# Patient Record
Sex: FEMALE | Race: WHITE | NOT HISPANIC OR LATINO | URBAN - METROPOLITAN AREA
[De-identification: names, ages, dates, MRNs, and addresses within clinical notes are randomized per-mention and may not be internally consistent; named-entity substitution may affect disease eponyms.]

---

## 2024-10-12 ENCOUNTER — OFFICE VISIT (OUTPATIENT)
Dept: URGENT CARE | Age: 34
End: 2024-10-12
Payer: COMMERCIAL

## 2024-10-12 VITALS
DIASTOLIC BLOOD PRESSURE: 70 MMHG | OXYGEN SATURATION: 97 % | SYSTOLIC BLOOD PRESSURE: 112 MMHG | RESPIRATION RATE: 16 BRPM | TEMPERATURE: 98.8 F | HEART RATE: 80 BPM

## 2024-10-12 DIAGNOSIS — G43.909 MIGRAINE WITHOUT STATUS MIGRAINOSUS, NOT INTRACTABLE, UNSPECIFIED MIGRAINE TYPE: Primary | ICD-10-CM

## 2024-10-12 RX ORDER — PROMETHAZINE HYDROCHLORIDE 12.5 MG/1
25 TABLET ORAL EVERY 6 HOURS PRN
Qty: 30 TABLET | Refills: 0 | Status: SHIPPED | OUTPATIENT
Start: 2024-10-12 | End: 2024-10-19

## 2024-10-12 RX ORDER — PROMETHAZINE HYDROCHLORIDE 25 MG/ML
25 INJECTION, SOLUTION INTRAMUSCULAR; INTRAVENOUS ONCE
Status: COMPLETED | OUTPATIENT
Start: 2024-10-12 | End: 2024-10-12

## 2024-10-12 RX ORDER — KETOROLAC TROMETHAMINE 30 MG/ML
30 INJECTION, SOLUTION INTRAMUSCULAR; INTRAVENOUS ONCE
Status: DISCONTINUED | OUTPATIENT
Start: 2024-10-12 | End: 2024-10-13

## 2024-10-12 RX ORDER — TRIAMCINOLONE ACETONIDE 40 MG/ML
40 INJECTION, SUSPENSION INTRA-ARTICULAR; INTRAMUSCULAR ONCE
Status: COMPLETED | OUTPATIENT
Start: 2024-10-12 | End: 2024-10-12

## 2024-10-12 RX ORDER — SUMATRIPTAN 50 MG/1
50 TABLET, FILM COATED ORAL ONCE AS NEEDED
Qty: 9 TABLET | Refills: 0 | Status: SHIPPED | OUTPATIENT
Start: 2024-10-12

## 2024-10-12 ASSESSMENT — ENCOUNTER SYMPTOMS
DIARRHEA: 0
PHOTOPHOBIA: 1
NECK PAIN: 0
WEAKNESS: 0
HEADACHES: 1
COUGH: 0
MYALGIAS: 0
ABDOMINAL PAIN: 0
BACK PAIN: 0
SWOLLEN GLANDS: 0
SYNCOPE: 0
EYE PAIN: 0
PARESTHESIAS: 0
VISUAL CHANGE: 0
FATIGUE: 1
LOSS OF BALANCE: 0
NECK STIFFNESS: 0
FEVER: 0
NAUSEA: 1
SINUS PRESSURE: 0
NUMBNESS: 0
NEAR-SYNCOPE: 0
VOMITING: 0
BLURRED VISION: 0
SEIZURES: 0
DIZZINESS: 0
TINGLING: 0
FOCAL WEAKNESS: 0
SORE THROAT: 0

## 2024-10-12 NOTE — PROGRESS NOTES
Subjective   Patient ID: Nirmala Kinney is a 34 y.o. female. They present today with a chief complaint of Headache (Hx Migraines. 4 days).    History of Present Illness  Pt reported history of migraines. She was treated with Toradol, steroid and compazine injection in the past. Pt left her prescription of imitrex 50 mg PO at her home in [place]. Pt requested for the medication and phenergan.      History provided by:  Patient   used: No    Headache  Pain location:  Frontal and L parietal  Radiates to:  Does not radiate  Severity currently:  6/10  Onset quality:  Gradual  Duration:  4 days  Timing:  Constant  Progression:  Waxing and waning  Chronicity:  New  Similar to prior headaches: no    Relieved by:  Nothing  Worsened by:  Activity, light, neck movement and sound  Ineffective treatments: Nurtec, ibuprofen and Tylenol.  Associated symptoms: fatigue, nausea and photophobia    Associated symptoms: no abdominal pain, no back pain, no blurred vision, no congestion, no cough, no diarrhea, no dizziness, no drainage, no ear pain, no eye pain, no facial pain, no fever, no focal weakness, no hearing loss, no loss of balance, no myalgias, no near-syncope, no neck pain, no neck stiffness, no numbness, no paresthesias, no seizures, no sinus pressure, no sore throat, no swollen glands, no syncope, no tingling, no URI, no visual change, no vomiting and no weakness    Associated symptoms comment:  Nausea      Past Medical History  Allergies as of 10/12/2024 - Reviewed 10/12/2024   Allergen Reaction Noted    Morphine Hives 10/12/2024    Zofran [ondansetron hcl] GI Upset 10/12/2024       (Not in a hospital admission)       Past Medical History:   Diagnosis Date    Calculus of kidney     Bilateral nephrolithiasis       Past Surgical History:   Procedure Laterality Date    OTHER SURGICAL HISTORY  08/19/2018    Lithotripsy            Review of Systems  Review of Systems   Constitutional:  Positive for fatigue.  Negative for fever.   HENT:  Negative for congestion, ear pain, hearing loss, postnasal drip, sinus pressure and sore throat.    Eyes:  Positive for photophobia. Negative for blurred vision and pain.   Respiratory:  Negative for cough.    Cardiovascular:  Negative for syncope and near-syncope.   Gastrointestinal:  Positive for nausea. Negative for abdominal pain, diarrhea and vomiting.   Musculoskeletal:  Negative for back pain, myalgias, neck pain and neck stiffness.   Neurological:  Positive for headaches. Negative for dizziness, focal weakness, seizures, weakness, numbness, paresthesias and loss of balance.                                  Objective    Vitals:    10/12/24 1940   BP: 112/70   Pulse: 80   Resp: 16   Temp: 37.1 °C (98.8 °F)   SpO2: 97%     No LMP recorded.    Physical Exam  Vitals and nursing note reviewed.   Constitutional:       Appearance: Normal appearance.   HENT:      Head: Normocephalic and atraumatic.      Right Ear: Hearing, tympanic membrane, ear canal and external ear normal.      Left Ear: Hearing, tympanic membrane, ear canal and external ear normal.      Nose: Nose normal. No nasal deformity, septal deviation, signs of injury, laceration, nasal tenderness, mucosal edema, congestion or rhinorrhea.      Right Sinus: No maxillary sinus tenderness or frontal sinus tenderness.      Left Sinus: No maxillary sinus tenderness or frontal sinus tenderness.      Mouth/Throat:      Lips: Pink.      Mouth: Mucous membranes are moist.      Pharynx: Uvula midline.      Tonsils: No tonsillar exudate or tonsillar abscesses.   Cardiovascular:      Rate and Rhythm: Normal rate and regular rhythm.      Heart sounds: Normal heart sounds.   Pulmonary:      Effort: Pulmonary effort is normal.      Breath sounds: Normal breath sounds and air entry.   Musculoskeletal:      Cervical back: Normal range of motion and neck supple.   Lymphadenopathy:      Cervical: No cervical adenopathy.   Neurological:       Mental Status: She is alert.   Psychiatric:         Mood and Affect: Mood normal.         Behavior: Behavior normal.         Procedures    Point of Care Test & Imaging Results from this visit  No results found for this visit on 10/12/24.   No results found.    Diagnostic study results (if any) were reviewed by [facility].    Assessment/Plan   Allergies, medications, history, and pertinent labs/EKGs/Imaging reviewed by ANGELITO Castro.     Medical Decision Making  Patient Toradol, steriod, and compazine improved the migraine in the past.  Toradol, steroid, and phenergan administered during the visit.  Take Imitrex and Phenergan as instructed.   Follow up with your primary care provider as needed.  Call 911 or go to the nearest ER if the symptoms worsen.  Pt verbalized understanding. Patient left in stable condition.    Orders and Diagnoses  Diagnoses and all orders for this visit:  Migraine without status migrainosus, not intractable, unspecified migraine type  -     triamcinolone acetonide (Kenalog-40) injection 40 mg  -     ketorolac (Toradol) injection 30 mg  -     promethazine (Phenergan) injection 25 mg  -     SUMAtriptan (Imitrex) 50 mg tablet; Take 1 tablet (50 mg) by mouth 1 time if needed for migraine for up to 9 doses. May repeat dose once in 2 hours if no relief.  Do not exceed 2 doses in 24 hours.  -     promethazine (Phenergan) 12.5 mg tablet; Take 2 tablets (25 mg) by mouth every 6 hours if needed for nausea or vomiting for up to 7 days.      Medical Admin Record      Patient disposition: Home    Electronically signed by [facility]  8:09 PM

## 2024-10-13 RX ORDER — KETOROLAC TROMETHAMINE 30 MG/ML
30 INJECTION, SOLUTION INTRAMUSCULAR; INTRAVENOUS ONCE
Status: COMPLETED | OUTPATIENT
Start: 2024-10-13 | End: 2024-10-13

## 2024-10-13 ASSESSMENT — PAIN SCALES - PAIN ASSESSMENT IN ADVANCED DEMENTIA (PAINAD): TOTALSCORE: MEDICATION (SEE MAR)

## 2024-10-13 NOTE — PATIENT INSTRUCTIONS
Take Imitrex and Phenergan as instructed.   Follow up with your primary care provider as needed.  Call 911 or go to the nearest ER if the symptoms worsen.

## 2025-07-11 ENCOUNTER — LAB REQUISITION (OUTPATIENT)
Dept: LAB | Facility: HOSPITAL | Age: 35
End: 2025-07-11

## 2025-07-11 DIAGNOSIS — R06.02 SHORTNESS OF BREATH: ICD-10-CM

## 2025-07-11 DIAGNOSIS — R60.0 LOCALIZED EDEMA: ICD-10-CM

## 2025-07-11 LAB — D DIMER PPP FEU-MCNC: 397 NG/ML FEU

## 2025-07-11 PROCEDURE — 85379 FIBRIN DEGRADATION QUANT: CPT
